# Patient Record
Sex: FEMALE | Race: WHITE | NOT HISPANIC OR LATINO | Employment: OTHER | URBAN - METROPOLITAN AREA
[De-identification: names, ages, dates, MRNs, and addresses within clinical notes are randomized per-mention and may not be internally consistent; named-entity substitution may affect disease eponyms.]

---

## 2018-09-13 ENCOUNTER — OFFICE VISIT (OUTPATIENT)
Dept: ENDOCRINOLOGY | Facility: HOSPITAL | Age: 73
End: 2018-09-13
Payer: COMMERCIAL

## 2018-09-13 VITALS
DIASTOLIC BLOOD PRESSURE: 68 MMHG | SYSTOLIC BLOOD PRESSURE: 116 MMHG | HEART RATE: 64 BPM | BODY MASS INDEX: 27.33 KG/M2 | HEIGHT: 60 IN | WEIGHT: 139.2 LBS

## 2018-09-13 DIAGNOSIS — Z92.3 HISTORY OF HEAD AND NECK RADIATION: ICD-10-CM

## 2018-09-13 DIAGNOSIS — Z98.890 HISTORY OF SURGICAL REMOVAL OF PITUITARY GLAND (HCC): Primary | ICD-10-CM

## 2018-09-13 DIAGNOSIS — E89.3 HISTORY OF SURGICAL REMOVAL OF PITUITARY GLAND (HCC): Primary | ICD-10-CM

## 2018-09-13 DIAGNOSIS — E83.52 HYPERCALCEMIA: ICD-10-CM

## 2018-09-13 PROCEDURE — 99204 OFFICE O/P NEW MOD 45 MIN: CPT | Performed by: INTERNAL MEDICINE

## 2018-09-13 RX ORDER — VENLAFAXINE HYDROCHLORIDE 75 MG/1
75 CAPSULE, EXTENDED RELEASE ORAL DAILY
COMMUNITY
Start: 2018-08-27

## 2018-09-13 RX ORDER — SIMVASTATIN 40 MG
40 TABLET ORAL
COMMUNITY
Start: 2018-06-27

## 2018-09-13 RX ORDER — ASPIRIN 325 MG
325 TABLET ORAL DAILY
COMMUNITY

## 2018-09-13 RX ORDER — ACEBUTOLOL HYDROCHLORIDE 400 MG/1
400 CAPSULE ORAL DAILY
COMMUNITY
Start: 2018-07-02

## 2018-09-13 RX ORDER — LANSOPRAZOLE 30 MG/1
30 CAPSULE, DELAYED RELEASE ORAL DAILY
COMMUNITY
Start: 2018-07-01

## 2018-09-13 RX ORDER — SPIRONOLACTONE 25 MG/1
25 TABLET ORAL DAILY
COMMUNITY
Start: 2018-08-13

## 2018-09-13 RX ORDER — CHOLECALCIFEROL (VITAMIN D3) 125 MCG
2000 CAPSULE ORAL DAILY
COMMUNITY

## 2018-09-13 NOTE — LETTER
September 13, 2018     Severa Fess  [address]    Patient: Onel Delgadillo   YOB: 1945   Date of Visit: 9/13/2018       Dear Dr Clinton Sosa: Thank you for referring Onel Delgadillo to me for evaluation  Below are my notes for this consultation  If you have questions, please do not hesitate to call me  I look forward to following your patient along with you  Sincerely,        Hal Marte DO        CC: No Recipients  Hal Marte DO  9/13/2018 11:13 AM  Sign at close encounter  9/13/2018    Assessment/Plan      Diagnoses and all orders for this visit:    History of surgical removal of pituitary gland (HCC)    History of head and neck radiation    Hypercalcemia  -     Comprehensive metabolic panel Lab Collect; Future  -     PTH, intact- Lab Collect; Future  -     Cortisol Level, AM Specimen- Lab Collect; Future  -     Vitamin D 25 hydroxy Lab Collect; Future    Other orders  -     acebutolol (SECTRAL) 400 MG capsule;   -     venlafaxine (EFFEXOR-XR) 75 mg 24 hr capsule;   -     lansoprazole (PREVACID) 30 mg capsule;   -     simvastatin (ZOCOR) 40 mg tablet;   -     spironolactone (ALDACTONE) 25 mg tablet;   -     aspirin 325 mg tablet; Take 325 mg by mouth daily  -     Multiple Vitamins-Minerals (MULTIVITAMIN ADULT PO); Take by mouth  -     Cholecalciferol (VITAMIN D3) 2000 units TABS; Take 2,000 Units by mouth daily  -     Cetirizine HCl (ZYRTEC ALLERGY) 10 MG CAPS; Take by mouth        Assessment/Plan:  1  History of pituitary tumor status post transsphenoidal resection as well as radiation treatment:  MRI from April 2017 did not show any changes  I think we can hold off on repeat imaging at this time  Will discuss at her next appointment regarding repeat imaging of the MRI  Given her history of transsphenoidal surgery as well as radiation, we are monitoring for pituitary hormonal hypofunction    At this point she has not developed any   I am seeing her back in 6 months for evaluation of hypercalcemia as noted below  At that appointment I will reorder blood work for her appointment next September for hormonal evaluation  Given that her cortisol level was measured at 1:00 p m  in the recent blood work, I did order an a m  cortisol to be added to her upcoming calcium evaluation  2   Hypercalcemia:  Incidentally discovered on recent blood work  No history of kidney stones  She does state she has osteopenia is being considered for Prolia  I advised her to increase her hydration  We will recheck a CMP  I have also added a 25 hydroxy vitamin-D and PTH to be drawn with this  I discussed that if it looks like a parathyroid ideology, we may need to do additional testing such as DEXA scan, 24 urine for calcium, renal ultrasound  We will be in touch with her as results are available  Follow-up in 6 months  CC:   History of pituitary surgery and radiation    History of Present Illness     HPI: Viviane Knapp is a 74y o  year old female with history of pituitary tumor that was discovered about 20 years ago when she developed left-sided numbness and tingling and weakness  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  She was then sent for transsphenoidal surgery at [facility]  Few years later she developed a recurrence and she required radiation treatment  After that, she is monitored by doctor Ann Sanchez for any development of pituitary hormone hypofunction  She has remained free of requiring any hormone supplementation  She presents today to establish care and review blood work  Overall she feels well  She states that she had recent rotator cuff surgery and was recovering from this  She reports no symptoms of hypothyroidism  Denies any frequent headaches or vision changes  Denies any weight changes, lightheadedness, dizziness  She denies recent kidney stones    She does have history of osteopenia and has seen a rheumatologist for this to suggested Prolia at some point  She has not received Prolia yet  No history of fragility fracture  No known family history of hypercalcemia or parathyroid pathology  No family history of pituitary pathology as well  She does not family history of thyroid disorder  Review of Systems   Constitutional: Negative for fatigue  HENT: Negative for trouble swallowing and voice change  Eyes: Negative for visual disturbance  Respiratory: Negative for shortness of breath  Cardiovascular: Negative for palpitations and leg swelling  Gastrointestinal: Negative for abdominal pain, nausea and vomiting  Endocrine: Negative for polydipsia and polyuria  Musculoskeletal: Negative for arthralgias and myalgias  Skin: Negative for rash  Neurological: Negative for dizziness, tremors and weakness  Hematological: Negative for adenopathy  Psychiatric/Behavioral: Negative for agitation and confusion  Historical Information   No past medical history on file  No past surgical history on file    Social History   History   Alcohol use Not on file     History   Drug use: Unknown     History   Smoking Status    Never Smoker   Smokeless Tobacco    Never Used     Family History:   Family History   Problem Relation Age of Onset    Arthritis Mother     Diabetes unspecified Mother     Cancer Mother     Hypertension Mother     Heart failure Father     Arthritis Father     Diabetes unspecified Father     Cancer Father     Hypertension Father     Heart disease Sister     Heart attack Sister     Stroke Brother     Gout Son     Hyperthyroidism Son     Hyperthyroidism Daughter        Meds/Allergies   Current Outpatient Prescriptions   Medication Sig Dispense Refill    acebutolol (SECTRAL) 400 MG capsule       aspirin 325 mg tablet Take 325 mg by mouth daily      Cetirizine HCl (ZYRTEC ALLERGY) 10 MG CAPS Take by mouth      Cholecalciferol (VITAMIN D3) 2000 units TABS Take 2,000 Units by mouth daily      lansoprazole (PREVACID) 30 mg capsule       Multiple Vitamins-Minerals (MULTIVITAMIN ADULT PO) Take by mouth      simvastatin (ZOCOR) 40 mg tablet       spironolactone (ALDACTONE) 25 mg tablet       venlafaxine (EFFEXOR-XR) 75 mg 24 hr capsule        No current facility-administered medications for this visit  Allergies   Allergen Reactions    Oxycodone     Sulfa Antibiotics        Objective   Vitals: Blood pressure 116/68, pulse 64, height 5' (1 524 m), weight 63 1 kg (139 lb 3 2 oz)  Invasive Devices          No matching active lines, drains, or airways          Physical Exam   Constitutional: She is oriented to person, place, and time  She appears well-developed and well-nourished  No distress  HENT:   Head: Normocephalic and atraumatic  Eyes: Conjunctivae are normal  Pupils are equal, round, and reactive to light  Neck: Normal range of motion  Neck supple  No thyromegaly present  Cardiovascular: Normal rate and regular rhythm  No murmur heard  Pulmonary/Chest: Effort normal and breath sounds normal  No respiratory distress  Abdominal: Soft  Bowel sounds are normal  She exhibits no distension  Musculoskeletal: Normal range of motion  She exhibits no edema  Lymphadenopathy:     She has no cervical adenopathy  Neurological: She is alert and oriented to person, place, and time  She exhibits normal muscle tone  Skin: Skin is warm and dry  No rash noted  She is not diaphoretic  Psychiatric: She has a normal mood and affect  Her behavior is normal        The history was obtained from the review of the chart and from the patient      Lab Results:      Labs from 09/06/2018 at [facility]:  Glucose 138, BUN 15, creatinine 0 9, GFR greater than 60, sodium 140, potassium 4 1, alk phos 74, calcium 10 5, albumin 4 7, bilirubin 0 9, AST 21, ALT 28, white blood cell 7 4, hemoglobin 13 1, hematocrit 36 8, platelets 439, free T4 1 05, FSH 26 6, LH 13 1, prolactin 16 9, random cortisol at 1:54 p m  was 4 6 IGF-1 120     04/03/2017 at [facility]:  MRI shows stable postsurgical appearance of the sella turcica following transsphenoidal resection of the pituitary neoplasm  No MRI evidence for new enhancing mass to suggest local tumor recurrence  No future appointments

## 2018-09-13 NOTE — PROGRESS NOTES
9/13/2018    Assessment/Plan      Diagnoses and all orders for this visit:    History of surgical removal of pituitary gland (HCC)    History of head and neck radiation    Hypercalcemia  -     Comprehensive metabolic panel Lab Collect; Future  -     PTH, intact- Lab Collect; Future  -     Cortisol Level, AM Specimen- Lab Collect; Future  -     Vitamin D 25 hydroxy Lab Collect; Future    Other orders  -     acebutolol (SECTRAL) 400 MG capsule;   -     venlafaxine (EFFEXOR-XR) 75 mg 24 hr capsule;   -     lansoprazole (PREVACID) 30 mg capsule;   -     simvastatin (ZOCOR) 40 mg tablet;   -     spironolactone (ALDACTONE) 25 mg tablet;   -     aspirin 325 mg tablet; Take 325 mg by mouth daily  -     Multiple Vitamins-Minerals (MULTIVITAMIN ADULT PO); Take by mouth  -     Cholecalciferol (VITAMIN D3) 2000 units TABS; Take 2,000 Units by mouth daily  -     Cetirizine HCl (ZYRTEC ALLERGY) 10 MG CAPS; Take by mouth        Assessment/Plan:  1  History of pituitary tumor status post transsphenoidal resection as well as radiation treatment:  MRI from April 2017 did not show any changes  I think we can hold off on repeat imaging at this time  Will discuss at her next appointment regarding repeat imaging of the MRI  Given her history of transsphenoidal surgery as well as radiation, we are monitoring for pituitary hormonal hypofunction  At this point she has not developed any  I am seeing her back in 6 months for evaluation of hypercalcemia as noted below  At that appointment I will reorder blood work for her appointment next September for hormonal evaluation  Given that her cortisol level was measured at 1:00 p m  in the recent blood work, I did order an a m  cortisol to be added to her upcoming calcium evaluation  2   Hypercalcemia:  Incidentally discovered on recent blood work  No history of kidney stones  She does state she has osteopenia is being considered for Prolia  I advised her to increase her hydration  We will recheck a CMP  I have also added a 25 hydroxy vitamin-D and PTH to be drawn with this  I discussed that if it looks like a parathyroid ideology, we may need to do additional testing such as DEXA scan, 24 urine for calcium, renal ultrasound  We will be in touch with her as results are available  Follow-up in 6 months  CC:   History of pituitary surgery and radiation    History of Present Illness     HPI: Antonette De Leon is a 74y o  year old female with history of pituitary tumor that was discovered about 20 years ago when she developed left-sided numbness and tingling and weakness  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  She was then sent for transsphenoidal surgery at [facility]  Few years later she developed a recurrence and she required radiation treatment  After that, she is monitored by doctor Aurelia Fierro for any development of pituitary hormone hypofunction  She has remained free of requiring any hormone supplementation  She presents today to establish care and review blood work  Overall she feels well  She states that she had recent rotator cuff surgery and was recovering from this  She reports no symptoms of hypothyroidism  Denies any frequent headaches or vision changes  Denies any weight changes, lightheadedness, dizziness  She denies recent kidney stones  She does have history of osteopenia and has seen a rheumatologist for this to suggested Prolia at some point  She has not received Prolia yet  No history of fragility fracture  No known family history of hypercalcemia or parathyroid pathology  No family history of pituitary pathology as well  She does not family history of thyroid disorder  Review of Systems   Constitutional: Negative for fatigue  HENT: Negative for trouble swallowing and voice change  Eyes: Negative for visual disturbance  Respiratory: Negative for shortness of breath  Cardiovascular: Negative for palpitations and leg swelling  Gastrointestinal: Negative for abdominal pain, nausea and vomiting  Endocrine: Negative for polydipsia and polyuria  Musculoskeletal: Negative for arthralgias and myalgias  Skin: Negative for rash  Neurological: Negative for dizziness, tremors and weakness  Hematological: Negative for adenopathy  Psychiatric/Behavioral: Negative for agitation and confusion  Historical Information   No past medical history on file  No past surgical history on file  Social History   History   Alcohol use Not on file     History   Drug use: Unknown     History   Smoking Status    Never Smoker   Smokeless Tobacco    Never Used     Family History:   Family History   Problem Relation Age of Onset    Arthritis Mother     Diabetes unspecified Mother     Cancer Mother     Hypertension Mother     Heart failure Father     Arthritis Father     Diabetes unspecified Father     Cancer Father     Hypertension Father     Heart disease Sister     Heart attack Sister     Stroke Brother     Gout Son     Hyperthyroidism Son     Hyperthyroidism Daughter        Meds/Allergies   Current Outpatient Prescriptions   Medication Sig Dispense Refill    acebutolol (SECTRAL) 400 MG capsule       aspirin 325 mg tablet Take 325 mg by mouth daily      Cetirizine HCl (ZYRTEC ALLERGY) 10 MG CAPS Take by mouth      Cholecalciferol (VITAMIN D3) 2000 units TABS Take 2,000 Units by mouth daily      lansoprazole (PREVACID) 30 mg capsule       Multiple Vitamins-Minerals (MULTIVITAMIN ADULT PO) Take by mouth      simvastatin (ZOCOR) 40 mg tablet       spironolactone (ALDACTONE) 25 mg tablet       venlafaxine (EFFEXOR-XR) 75 mg 24 hr capsule        No current facility-administered medications for this visit  Allergies   Allergen Reactions    Oxycodone     Sulfa Antibiotics        Objective   Vitals: Blood pressure 116/68, pulse 64, height 5' (1 524 m), weight 63 1 kg (139 lb 3 2 oz)    Invasive Devices          No matching active lines, drains, or airways          Physical Exam   Constitutional: She is oriented to person, place, and time  She appears well-developed and well-nourished  No distress  HENT:   Head: Normocephalic and atraumatic  Eyes: Conjunctivae are normal  Pupils are equal, round, and reactive to light  Neck: Normal range of motion  Neck supple  No thyromegaly present  Cardiovascular: Normal rate and regular rhythm  No murmur heard  Pulmonary/Chest: Effort normal and breath sounds normal  No respiratory distress  Abdominal: Soft  Bowel sounds are normal  She exhibits no distension  Musculoskeletal: Normal range of motion  She exhibits no edema  Lymphadenopathy:     She has no cervical adenopathy  Neurological: She is alert and oriented to person, place, and time  She exhibits normal muscle tone  Skin: Skin is warm and dry  No rash noted  She is not diaphoretic  Psychiatric: She has a normal mood and affect  Her behavior is normal        The history was obtained from the review of the chart and from the patient  Lab Results:      Labs from 09/06/2018 at [facility]:  Glucose 138, BUN 15, creatinine 0 9, GFR greater than 60, sodium 140, potassium 4 1, alk phos 74, calcium 10 5, albumin 4 7, bilirubin 0 9, AST 21, ALT 28, white blood cell 7 4, hemoglobin 13 1, hematocrit 36 8, platelets 143, free T4 1 05, FSH 26 6, LH 13 1, prolactin 16 9, random cortisol at 1:54 p m  was 4 6 IGF-1 120     04/03/2017 at [facility]:  MRI shows stable postsurgical appearance of the sella turcica following transsphenoidal resection of the pituitary neoplasm  No MRI evidence for new enhancing mass to suggest local tumor recurrence  No future appointments

## 2018-10-22 ENCOUNTER — TELEPHONE (OUTPATIENT)
Dept: ENDOCRINOLOGY | Facility: HOSPITAL | Age: 73
End: 2018-10-22

## 2018-10-22 NOTE — TELEPHONE ENCOUNTER
Please let patient know her calcium level is now at the upper end of the normal range and her other testing done at [facility] on 10/12/2018 looked normal     Labs from [facility] on 10/12/2018:  Glucose 120, BUN 12, creatinine 0 8, GFR greater than 60, sodium 143, potassium 4 7, alk phos 73, calcium 10 1, albumin 4 5, bilirubin 1 0, AST 23, ALT 24, cortisol 10 6 at 7:00 a m , 25 hydroxy vitamin-D 58 2, PTH 53 35

## 2019-03-21 ENCOUNTER — OFFICE VISIT (OUTPATIENT)
Dept: ENDOCRINOLOGY | Facility: HOSPITAL | Age: 74
End: 2019-03-21
Payer: COMMERCIAL

## 2019-03-21 VITALS
DIASTOLIC BLOOD PRESSURE: 68 MMHG | WEIGHT: 142.8 LBS | HEIGHT: 60 IN | SYSTOLIC BLOOD PRESSURE: 132 MMHG | BODY MASS INDEX: 28.03 KG/M2 | HEART RATE: 64 BPM

## 2019-03-21 DIAGNOSIS — Z92.3 HISTORY OF HEAD AND NECK RADIATION: Primary | ICD-10-CM

## 2019-03-21 DIAGNOSIS — E83.52 HYPERCALCEMIA: ICD-10-CM

## 2019-03-21 DIAGNOSIS — Z98.890 HISTORY OF SURGICAL REMOVAL OF PITUITARY GLAND (HCC): ICD-10-CM

## 2019-03-21 DIAGNOSIS — E89.3 HISTORY OF SURGICAL REMOVAL OF PITUITARY GLAND (HCC): ICD-10-CM

## 2019-03-21 PROCEDURE — 99214 OFFICE O/P EST MOD 30 MIN: CPT | Performed by: INTERNAL MEDICINE

## 2019-03-21 NOTE — PROGRESS NOTES
3/21/2019    Assessment/Plan      Diagnoses and all orders for this visit:    History of head and neck radiation  -     Comprehensive metabolic panel Lab Collect; Future  -     Insulin-like growth factor 1 (IGF-1) - Lab Collect; Future  -     Cortisol Level, AM Specimen- Lab Collect; Future  -     TSH, 3rd generation Lab Collect; Future  -     T4, free Lab Collect; Future  -     Prolactin- Lab Collect; Future  -     Follicle stimulating hormone Lab Collect; Future  -     Luteinizing hormone Lab Collect; Future  -     MRI brain pituitary wo and w contrast; Future    History of surgical removal of pituitary gland ([facility] [address] )  -     Comprehensive metabolic panel Lab Collect; Future  -     Insulin-like growth factor 1 (IGF-1) - Lab Collect; Future  -     Cortisol Level, AM Specimen- Lab Collect; Future  -     TSH, 3rd generation Lab Collect; Future  -     T4, free Lab Collect; Future  -     Prolactin- Lab Collect; Future  -     Follicle stimulating hormone Lab Collect; Future  -     Luteinizing hormone Lab Collect; Future  -     MRI brain pituitary wo and w contrast; Future    Hypercalcemia  -     Comprehensive metabolic panel Lab Collect; Future  -     Insulin-like growth factor 1 (IGF-1) - Lab Collect; Future  -     Cortisol Level, AM Specimen- Lab Collect; Future  -     TSH, 3rd generation Lab Collect; Future  -     T4, free Lab Collect; Future  -     Prolactin- Lab Collect; Future  -     Follicle stimulating hormone Lab Collect; Future  -     Luteinizing hormone Lab Collect; Future  -     MRI brain pituitary wo and w contrast; Future        Assessment/Plan:  1  History of pituitary tumor status post transsphenoidal resection radiation:  Clinically she is doing well  I will plan to repeat a pituitary hormonal workup as listed above prior to next appointment which will be in September  I have also ordered a pituitary MRI to be done prior to that appointment    As long as everything looks stable at that appointment, I will plan to see her back annually  2  Mild hypercalcemia:  Corrects to high normal with her higher albumin level  I have suggest suggested we monitor this over time  CC: follow up    History of Present Illness     HPI: Arpit Mansfield is a 74y o  year old female with history of a pituitary tumor that was discovered over 20 years ago when she developed left-sided numbness and tingling and weakness  She then underwent [facility]  She then developed a recurrence and required radiation treatment  After that she has been monitored by Endocrinology for development of any pituitary hormone dysfunction  She presents today for follow-up appointment  She presents today overall feeling well  No vision changes  She has been getting more headaches lately when she is reading her focusing her eyes more  No peripheral vision changes  Review of Systems   Constitutional: Negative for fatigue  HENT: Negative for trouble swallowing and voice change  Eyes: Negative for visual disturbance  Respiratory: Negative for shortness of breath  Cardiovascular: Negative for palpitations and leg swelling  Gastrointestinal: Negative for abdominal pain, nausea and vomiting  Endocrine: Negative for polydipsia and polyuria  Musculoskeletal: Negative for arthralgias and myalgias  Skin: Negative for rash  Neurological: Negative for dizziness, tremors and weakness  Hematological: Negative for adenopathy  Psychiatric/Behavioral: Negative for agitation and confusion  Historical Information   History reviewed  No pertinent past medical history  History reviewed  No pertinent surgical history    Social History   Social History     Substance and Sexual Activity   Alcohol Use Not on file     Social History     Substance and Sexual Activity   Drug Use Not on file     Social History     Tobacco Use   Smoking Status Never Smoker   Smokeless Tobacco Never Used     Family History:   Family History   Problem Relation Age of Onset    Arthritis Mother     Diabetes unspecified Mother     Cancer Mother     Hypertension Mother     Heart failure Father     Arthritis Father     Diabetes unspecified Father     Cancer Father     Hypertension Father     Heart disease Sister     Heart attack Sister     Stroke Brother     Gout Son     Hyperthyroidism Son     Hyperthyroidism Daughter        Meds/Allergies   Current Outpatient Medications   Medication Sig Dispense Refill    acebutolol (SECTRAL) 400 MG capsule       aspirin 325 mg tablet Take 325 mg by mouth daily      Cetirizine HCl (ZYRTEC ALLERGY) 10 MG CAPS Take by mouth      Cholecalciferol (VITAMIN D3) 2000 units TABS Take 2,000 Units by mouth daily      lansoprazole (PREVACID) 30 mg capsule       Multiple Vitamins-Minerals (MULTIVITAMIN ADULT PO) Take by mouth      simvastatin (ZOCOR) 40 mg tablet       spironolactone (ALDACTONE) 25 mg tablet       venlafaxine (EFFEXOR-XR) 75 mg 24 hr capsule        No current facility-administered medications for this visit  Allergies   Allergen Reactions    Oxycodone     Sulfa Antibiotics        Objective   Vitals: Blood pressure 132/68, pulse 64, height 5' (1 524 m), weight 64 8 kg (142 lb 12 8 oz)  Invasive Devices          None          Physical Exam   Constitutional: She is oriented to person, place, and time  She appears well-developed and well-nourished  No distress  HENT:   Head: Normocephalic and atraumatic  Neck: Normal range of motion  Neck supple  No thyromegaly present  Cardiovascular: Normal rate and regular rhythm  Pulmonary/Chest: Effort normal and breath sounds normal    Abdominal: Soft  Bowel sounds are normal    Musculoskeletal: Normal range of motion  She exhibits no edema  Neurological: She is alert and oriented to person, place, and time  She exhibits normal muscle tone  Skin: Skin is warm and dry  No rash noted  She is not diaphoretic     Psychiatric: She has a normal mood and affect  Her behavior is normal    Vitals reviewed  The history was obtained from the review of the chart and from the patient  Lab Results:      Labs from [facility] on 03/12/2019:  Glucose 126, BUN 16, creatinine 0 8, GFR greater than 60, sodium 139, potassium 4 6, calcium 10 5, albumin 4 8, liver function within normal limits, cortisol 12 6 at 7:56 a m , 25 hydroxy vitamin-D 55, PTH 47 22     04/03/2017 at [facility]:  MRI shows stable postsurgical appearance of the sella turcica following transsphenoidal resection of the pituitary neoplasm  No MRI evidence for new enhancing mass to suggest local tumor recurrence            No future appointments  Portions of the record may have been created with voice recognition software  Occasional wrong word or "sound a like" substitutions may have occurred due to the inherent limitations of voice recognition software  Read the chart carefully and recognize, using context, where substitutions have occurred

## 2019-09-23 ENCOUNTER — OFFICE VISIT (OUTPATIENT)
Dept: ENDOCRINOLOGY | Facility: HOSPITAL | Age: 74
End: 2019-09-23
Payer: COMMERCIAL

## 2019-09-23 VITALS
HEART RATE: 68 BPM | HEIGHT: 60 IN | SYSTOLIC BLOOD PRESSURE: 124 MMHG | BODY MASS INDEX: 26.66 KG/M2 | WEIGHT: 135.8 LBS | DIASTOLIC BLOOD PRESSURE: 72 MMHG

## 2019-09-23 DIAGNOSIS — Z92.3 HISTORY OF HEAD AND NECK RADIATION: ICD-10-CM

## 2019-09-23 DIAGNOSIS — Z98.890 HISTORY OF SURGICAL REMOVAL OF PITUITARY GLAND (HCC): Primary | ICD-10-CM

## 2019-09-23 DIAGNOSIS — E89.3 HISTORY OF SURGICAL REMOVAL OF PITUITARY GLAND (HCC): Primary | ICD-10-CM

## 2019-09-23 PROCEDURE — 99214 OFFICE O/P EST MOD 30 MIN: CPT | Performed by: INTERNAL MEDICINE

## 2019-09-23 NOTE — PROGRESS NOTES
9/23/2019    Assessment/Plan      Diagnoses and all orders for this visit:    History of surgical removal of pituitary gland [facility])  -     Comprehensive metabolic panel Lab Collect; Future  -     Vitamin D 25 hydroxy Lab Collect; Future  -     PTH, intact- Lab Collect; Future  -     Cortisol Level, AM Specimen- Lab Collect; Future  -     TSH, 3rd generation Lab Collect; Future  -     T4, free Lab Collect; Future  -     Insulin-like growth factor 1 (IGF-1) Lab Collect; Future  -     Follicle stimulating hormone Lab Collect; Future  -     Luteinizing hormone Lab Collect; Future  -     Prolactin- Lab Collect; Future    History of head and neck radiation  -     Comprehensive metabolic panel Lab Collect; Future  -     Vitamin D 25 hydroxy Lab Collect; Future  -     PTH, intact- Lab Collect; Future  -     Cortisol Level, AM Specimen- Lab Collect; Future  -     TSH, 3rd generation Lab Collect; Future  -     T4, free Lab Collect; Future  -     Insulin-like growth factor 1 (IGF-1) Lab Collect; Future  -     Follicle stimulating hormone Lab Collect; Future  -     Luteinizing hormone Lab Collect; Future  -     Prolactin- Lab Collect; Future        Assessment/Plan:  40-year-old female presents for follow-up of history of pituitary adenoma status post surgery and radiation  Recent hormonal evaluation all looks stable as listed below  Recent MRI does not show any evidence of recurrence  I will plan to see her back in 1 year with labs as ordered above just prior  We will plan to repeat an MRI in about 2 or 3 years  I asked her to call me sooner with any questions or concerns  CC:  Follow-up    History of Present Illness     HPI: Colie Peabody is a 74y o  year old female with history of a pituitary tumor that was discovered over 20 years ago when she developed left-sided numbness and tingling and weakness  She then underwent transsphenoidal surgery at [facility]  She had a recurrence which required radiation treatment  She has been monitored by Endocrinology for the development of any pituitary hormone dysfunction over time  She presents today for follow-up appointment  Overall she feels well  Energy level is adequate  Weight has overall been stable over the past year  No gastrointestinal symptoms  No changes in vision or headaches  Review of Systems   Constitutional: Negative for fatigue  HENT: Negative for trouble swallowing and voice change  Eyes: Negative for visual disturbance  Respiratory: Negative for shortness of breath  Cardiovascular: Negative for palpitations and leg swelling  Gastrointestinal: Negative for abdominal pain, nausea and vomiting  Endocrine: Negative for polydipsia and polyuria  Musculoskeletal: Negative for arthralgias and myalgias  Skin: Negative for rash  Neurological: Negative for dizziness, tremors and weakness  Hematological: Negative for adenopathy  Psychiatric/Behavioral: Negative for agitation and confusion  Historical Information   History reviewed  No pertinent past medical history  History reviewed  No pertinent surgical history    Social History   Social History     Substance and Sexual Activity   Alcohol Use Not on file     Social History     Substance and Sexual Activity   Drug Use Not on file     Social History     Tobacco Use   Smoking Status Never Smoker   Smokeless Tobacco Never Used     Family History:   Family History   Problem Relation Age of Onset    Arthritis Mother     Diabetes unspecified Mother    [facility] Cancer Mother     Hypertension Mother     Heart failure Father     Arthritis Father     Diabetes unspecified Father     Cancer Father     Hypertension Father     Heart disease Sister     Heart attack Sister     Stroke Brother     Gout Son     Hyperthyroidism Son     Hyperthyroidism Daughter        Meds/Allergies   Current Outpatient Medications   Medication Sig Dispense Refill    acebutolol (SECTRAL) 400 MG capsule Take 400 mg by mouth daily       aspirin 325 mg tablet Take 325 mg by mouth daily      Cetirizine HCl (ZYRTEC ALLERGY) 10 MG CAPS Take by mouth      Cholecalciferol (VITAMIN D3) 2000 units TABS Take 2,000 Units by mouth daily      lansoprazole (PREVACID) 30 mg capsule Take 30 mg by mouth daily       Multiple Vitamins-Minerals (MULTIVITAMIN ADULT PO) Take by mouth      simvastatin (ZOCOR) 40 mg tablet Take 40 mg by mouth daily at bedtime       spironolactone (ALDACTONE) 25 mg tablet Take 25 mg by mouth daily       venlafaxine (EFFEXOR-XR) 75 mg 24 hr capsule Take 75 mg by mouth daily        No current facility-administered medications for this visit  Allergies   Allergen Reactions    Oxycodone     Sulfa Antibiotics        Objective   Vitals: Blood pressure 124/72, pulse 68, height 5' (1 524 m), weight 61 6 kg (135 lb 12 8 oz)  Invasive Devices     None                 Physical Exam   Constitutional: She is oriented to person, place, and time  She appears well-developed and well-nourished  No distress  HENT:   Head: Normocephalic and atraumatic  Mouth/Throat: No oropharyngeal exudate  Neck: Normal range of motion  Neck supple  No thyromegaly present  Cardiovascular: Normal rate and regular rhythm  Pulmonary/Chest: Effort normal and breath sounds normal  No respiratory distress  Abdominal: Soft  Bowel sounds are normal    Musculoskeletal: Normal range of motion  She exhibits no edema  Neurological: She is alert and oriented to person, place, and time  She exhibits normal muscle tone  Skin: Skin is warm and dry  No rash noted  She is not diaphoretic  Psychiatric: She has a normal mood and affect  Her behavior is normal    Vitals reviewed  The history was obtained from the review of the chart and from the patient  Lab Results:      MRI at [facility] on 09/05/2019:  Evidence of previous pituitary transsphenoidal surgery was stable appearance since exam from April of 2017   No evidence of recurrence  Labs from [facility] on 09/13/2019:  IGF-1 135, glucose 112, BUN 18, creatinine 0 9, GFR greater than 60, sodium 139, potassium 4 8, alk phos 69, calcium 10 4, albumin 4 8, AST 30, ALT 14, TSH 3 3, free T4 1 0, FSH 26 6, LH 13 2, prolactin 15 2, cortisol 17 3  No future appointments  Portions of the record may have been created with voice recognition software  Occasional wrong word or "sound a like" substitutions may have occurred due to the inherent limitations of voice recognition software  Read the chart carefully and recognize, using context, where substitutions have occurred

## 2021-06-30 ENCOUNTER — TELEPHONE (OUTPATIENT)
Dept: ENDOCRINOLOGY | Facility: HOSPITAL | Age: 76
End: 2021-06-30

## 2021-06-30 DIAGNOSIS — Z98.890 HISTORY OF SURGICAL REMOVAL OF PITUITARY GLAND (HCC): Primary | ICD-10-CM

## 2021-06-30 DIAGNOSIS — Z92.3 HISTORY OF HEAD AND NECK RADIATION: ICD-10-CM

## 2021-06-30 DIAGNOSIS — E89.3 HISTORY OF SURGICAL REMOVAL OF PITUITARY GLAND (HCC): Primary | ICD-10-CM

## 2021-06-30 NOTE — TELEPHONE ENCOUNTER
Scheduled pt for 10/25/21  She does not want to be seen until then as she wants linked to her 's npt appt  Labs & MRI have   Can you reorder  Can be mailed

## 2021-10-25 ENCOUNTER — OFFICE VISIT (OUTPATIENT)
Dept: ENDOCRINOLOGY | Facility: HOSPITAL | Age: 76
End: 2021-10-25
Payer: COMMERCIAL

## 2021-10-25 VITALS
DIASTOLIC BLOOD PRESSURE: 80 MMHG | WEIGHT: 147.4 LBS | BODY MASS INDEX: 28.94 KG/M2 | HEART RATE: 70 BPM | HEIGHT: 60 IN | SYSTOLIC BLOOD PRESSURE: 148 MMHG

## 2021-10-25 DIAGNOSIS — R73.01 IMPAIRED FASTING GLUCOSE: ICD-10-CM

## 2021-10-25 DIAGNOSIS — E83.52 HYPERCALCEMIA: Primary | ICD-10-CM

## 2021-10-25 DIAGNOSIS — Z92.3 HISTORY OF HEAD AND NECK RADIATION: ICD-10-CM

## 2021-10-25 PROCEDURE — 99214 OFFICE O/P EST MOD 30 MIN: CPT | Performed by: INTERNAL MEDICINE

## 2023-02-08 ENCOUNTER — TELEPHONE (OUTPATIENT)
Dept: ENDOCRINOLOGY | Facility: CLINIC | Age: 78
End: 2023-02-08

## 2023-02-08 DIAGNOSIS — Z92.3 HISTORY OF HEAD AND NECK RADIATION: ICD-10-CM

## 2023-02-08 DIAGNOSIS — E89.3 HISTORY OF SURGICAL REMOVAL OF PITUITARY GLAND (HCC): Primary | ICD-10-CM

## 2023-02-08 DIAGNOSIS — Z98.890 HISTORY OF SURGICAL REMOVAL OF PITUITARY GLAND (HCC): Primary | ICD-10-CM

## 2023-02-10 ENCOUNTER — TELEPHONE (OUTPATIENT)
Dept: ENDOCRINOLOGY | Facility: CLINIC | Age: 78
End: 2023-02-10

## 2023-02-10 DIAGNOSIS — Z92.3 HISTORY OF HEAD AND NECK RADIATION: ICD-10-CM

## 2023-02-10 DIAGNOSIS — E83.52 HYPERCALCEMIA: ICD-10-CM

## 2023-02-10 DIAGNOSIS — Z98.890 HISTORY OF SURGICAL REMOVAL OF PITUITARY GLAND (HCC): Primary | ICD-10-CM

## 2023-02-10 DIAGNOSIS — E89.3 HISTORY OF SURGICAL REMOVAL OF PITUITARY GLAND (HCC): Primary | ICD-10-CM

## 2023-02-10 NOTE — TELEPHONE ENCOUNTER
Pt called today requesting a new order  For blood work prior to her MRI but also she has an upcoming appoitnmemt with you  and would like to get labs done prior to her upcoming kat ointment

## 2023-02-13 NOTE — TELEPHONE ENCOUNTER
Spoke to pt's spouse  Notified labs were ordered and have to be fasting and drawn between 7-9AM  He will pass the message along  Lab orders mailed

## 2023-02-20 ENCOUNTER — APPOINTMENT (OUTPATIENT)
Dept: LAB | Facility: HOSPITAL | Age: 78
End: 2023-02-20
Attending: INTERNAL MEDICINE

## 2023-02-20 DIAGNOSIS — E89.3 HISTORY OF SURGICAL REMOVAL OF PITUITARY GLAND (HCC): ICD-10-CM

## 2023-02-20 DIAGNOSIS — Z92.3 HISTORY OF HEAD AND NECK RADIATION: ICD-10-CM

## 2023-02-20 DIAGNOSIS — Z98.890 HISTORY OF SURGICAL REMOVAL OF PITUITARY GLAND (HCC): ICD-10-CM

## 2023-02-20 DIAGNOSIS — E83.52 HYPERCALCEMIA: ICD-10-CM

## 2023-02-20 LAB
25(OH)D3 SERPL-MCNC: 34.2 NG/ML (ref 30–100)
ALBUMIN SERPL BCP-MCNC: 3.6 G/DL (ref 3.5–5)
ALP SERPL-CCNC: 72 U/L (ref 46–116)
ALT SERPL W P-5'-P-CCNC: 11 U/L (ref 12–78)
ANION GAP SERPL CALCULATED.3IONS-SCNC: 8 MMOL/L (ref 4–13)
AST SERPL W P-5'-P-CCNC: 22 U/L (ref 5–45)
BILIRUB SERPL-MCNC: 0.56 MG/DL (ref 0.2–1)
BUN SERPL-MCNC: 15 MG/DL (ref 5–25)
CALCIUM SERPL-MCNC: 9.2 MG/DL (ref 8.3–10.1)
CHLORIDE SERPL-SCNC: 102 MMOL/L (ref 96–108)
CO2 SERPL-SCNC: 28 MMOL/L (ref 21–32)
CORTIS AM PEAK SERPL-MCNC: 12.6 UG/DL (ref 4.2–22.4)
CREAT SERPL-MCNC: 0.8 MG/DL (ref 0.6–1.3)
GFR SERPL CREATININE-BSD FRML MDRD: 70 ML/MIN/1.73SQ M
GLUCOSE P FAST SERPL-MCNC: 103 MG/DL (ref 65–99)
PHOSPHATE SERPL-MCNC: 3.5 MG/DL (ref 2.3–4.1)
POTASSIUM SERPL-SCNC: 4.1 MMOL/L (ref 3.5–5.3)
PROLACTIN SERPL-MCNC: 11.2 NG/ML
PROT SERPL-MCNC: 8.4 G/DL (ref 6.4–8.4)
PTH-INTACT SERPL-MCNC: 72.2 PG/ML (ref 18.4–80.1)
SODIUM SERPL-SCNC: 138 MMOL/L (ref 135–147)
T4 FREE SERPL-MCNC: 0.89 NG/DL (ref 0.76–1.46)
TSH SERPL DL<=0.05 MIU/L-ACNC: 2.49 UIU/ML (ref 0.45–4.5)

## 2023-02-21 LAB — ACTH PLAS-MCNC: 29.5 PG/ML (ref 7.2–63.3)

## 2023-03-04 ENCOUNTER — HOSPITAL ENCOUNTER (OUTPATIENT)
Dept: MRI IMAGING | Facility: HOSPITAL | Age: 78
Discharge: HOME | End: 2023-03-04
Attending: INTERNAL MEDICINE

## 2023-03-04 DIAGNOSIS — E89.3 HISTORY OF SURGICAL REMOVAL OF PITUITARY GLAND (HCC): ICD-10-CM

## 2023-03-04 DIAGNOSIS — Z98.890 HISTORY OF SURGICAL REMOVAL OF PITUITARY GLAND (HCC): ICD-10-CM

## 2023-03-04 DIAGNOSIS — Z92.3 HISTORY OF HEAD AND NECK RADIATION: ICD-10-CM

## 2023-03-04 RX ADMIN — GADOBUTROL 7 ML: 604.72 INJECTION INTRAVENOUS at 14:14

## 2024-04-12 ENCOUNTER — APPOINTMENT (OUTPATIENT)
Dept: LAB | Facility: HOSPITAL | Age: 79
End: 2024-04-12
Payer: COMMERCIAL

## 2024-04-12 ENCOUNTER — TELEPHONE (OUTPATIENT)
Age: 79
End: 2024-04-12

## 2024-04-12 DIAGNOSIS — Z98.890 HISTORY OF SURGICAL REMOVAL OF PITUITARY GLAND (HCC): ICD-10-CM

## 2024-04-12 DIAGNOSIS — E89.3 HISTORY OF SURGICAL REMOVAL OF PITUITARY GLAND (HCC): ICD-10-CM

## 2024-04-12 DIAGNOSIS — R73.01 IMPAIRED FASTING GLUCOSE: Primary | ICD-10-CM

## 2024-04-12 DIAGNOSIS — R73.01 IMPAIRED FASTING GLUCOSE: ICD-10-CM

## 2024-04-12 DIAGNOSIS — Z92.3 HISTORY OF HEAD AND NECK RADIATION: ICD-10-CM

## 2024-04-12 LAB
ALBUMIN SERPL BCP-MCNC: 4.3 G/DL (ref 3.5–5)
ALP SERPL-CCNC: 51 U/L (ref 34–104)
ALT SERPL W P-5'-P-CCNC: 17 U/L (ref 7–52)
ANION GAP SERPL CALCULATED.3IONS-SCNC: 8 MMOL/L (ref 4–13)
AST SERPL W P-5'-P-CCNC: 21 U/L (ref 13–39)
BILIRUB SERPL-MCNC: 1.23 MG/DL (ref 0.2–1)
BUN SERPL-MCNC: 18 MG/DL (ref 5–25)
CALCIUM SERPL-MCNC: 9.8 MG/DL (ref 8.4–10.2)
CHLORIDE SERPL-SCNC: 99 MMOL/L (ref 96–108)
CO2 SERPL-SCNC: 27 MMOL/L (ref 21–32)
CORTIS AM PEAK SERPL-MCNC: 5.3 UG/DL (ref 6.7–22.6)
CREAT SERPL-MCNC: 0.83 MG/DL (ref 0.6–1.3)
EST. AVERAGE GLUCOSE BLD GHB EST-MCNC: 134 MG/DL
FSH SERPL-ACNC: 28.6 MIU/ML
GFR SERPL CREATININE-BSD FRML MDRD: 66 ML/MIN/1.73SQ M
GLUCOSE P FAST SERPL-MCNC: 100 MG/DL (ref 65–99)
HBA1C MFR BLD: 6.3 %
POTASSIUM SERPL-SCNC: 4.3 MMOL/L (ref 3.5–5.3)
PROLACTIN SERPL-MCNC: 11.41 NG/ML (ref 2.74–19.64)
PROT SERPL-MCNC: 8.6 G/DL (ref 6.4–8.4)
SODIUM SERPL-SCNC: 134 MMOL/L (ref 135–147)
T4 FREE SERPL-MCNC: 0.64 NG/DL (ref 0.61–1.12)
TSH SERPL DL<=0.05 MIU/L-ACNC: 2.45 UIU/ML (ref 0.45–4.5)

## 2024-04-12 PROCEDURE — 83036 HEMOGLOBIN GLYCOSYLATED A1C: CPT

## 2024-04-12 PROCEDURE — 84439 ASSAY OF FREE THYROXINE: CPT

## 2024-04-12 PROCEDURE — 36415 COLL VENOUS BLD VENIPUNCTURE: CPT

## 2024-04-12 PROCEDURE — 84443 ASSAY THYROID STIM HORMONE: CPT

## 2024-04-12 PROCEDURE — 83001 ASSAY OF GONADOTROPIN (FSH): CPT

## 2024-04-12 PROCEDURE — 84146 ASSAY OF PROLACTIN: CPT

## 2024-04-12 PROCEDURE — 84305 ASSAY OF SOMATOMEDIN: CPT

## 2024-04-12 PROCEDURE — 82533 TOTAL CORTISOL: CPT

## 2024-04-12 PROCEDURE — 80053 COMPREHEN METABOLIC PANEL: CPT

## 2024-04-12 NOTE — TELEPHONE ENCOUNTER
Aleksandra from [facility]'s Lab called in stating pt's labs were . I called the [place] office to have new script sent over for pt.

## 2024-04-14 LAB — IGF-I SERPL-MCNC: 109 NG/ML (ref 42–185)

## 2024-04-17 ENCOUNTER — TREATMENT (OUTPATIENT)
Dept: ENDOCRINOLOGY | Facility: CLINIC | Age: 79
End: 2024-04-17

## 2024-04-17 ENCOUNTER — TELEPHONE (OUTPATIENT)
Dept: ENDOCRINOLOGY | Facility: CLINIC | Age: 79
End: 2024-04-17

## 2024-04-17 ENCOUNTER — OFFICE VISIT (OUTPATIENT)
Dept: ENDOCRINOLOGY | Facility: CLINIC | Age: 79
End: 2024-04-17
Payer: COMMERCIAL

## 2024-04-17 VITALS
SYSTOLIC BLOOD PRESSURE: 122 MMHG | BODY MASS INDEX: 27.13 KG/M2 | DIASTOLIC BLOOD PRESSURE: 76 MMHG | WEIGHT: 138.2 LBS | HEIGHT: 60 IN

## 2024-04-17 DIAGNOSIS — R79.89 LOW SERUM CORTISOL LEVEL: ICD-10-CM

## 2024-04-17 DIAGNOSIS — E89.3 HISTORY OF SURGICAL REMOVAL OF PITUITARY GLAND (HCC): ICD-10-CM

## 2024-04-17 DIAGNOSIS — Z98.890 HISTORY OF SURGICAL REMOVAL OF PITUITARY GLAND (HCC): ICD-10-CM

## 2024-04-17 DIAGNOSIS — Z92.3 HISTORY OF HEAD AND NECK RADIATION: ICD-10-CM

## 2024-04-17 DIAGNOSIS — Z92.3 HISTORY OF HEAD AND NECK RADIATION: Primary | ICD-10-CM

## 2024-04-17 DIAGNOSIS — E89.3 HISTORY OF SURGICAL REMOVAL OF PITUITARY GLAND (HCC): Primary | ICD-10-CM

## 2024-04-17 DIAGNOSIS — Z98.890 HISTORY OF SURGICAL REMOVAL OF PITUITARY GLAND (HCC): Primary | ICD-10-CM

## 2024-04-17 PROCEDURE — 99214 OFFICE O/P EST MOD 30 MIN: CPT | Performed by: INTERNAL MEDICINE

## 2024-04-17 RX ORDER — ROSUVASTATIN CALCIUM 20 MG/1
TABLET, COATED ORAL
COMMUNITY
Start: 2024-02-15

## 2024-04-17 NOTE — TELEPHONE ENCOUNTER
ACTH stimulation test to be done at [facility] has been ordered.  Please assist in arranging.  Thanks.

## 2024-04-17 NOTE — PROGRESS NOTES
4/17/2024    Assessment/Plan      Diagnoses and all orders for this visit:    History of surgical removal of pituitary gland (HCC)    History of head and neck radiation    Low serum cortisol level    Other orders  -     rosuvastatin (CRESTOR) 20 MG tablet        Assessment/Plan:  78-year-old female presents for follow-up appointment given history of pituitary gland surgery and radiation.  To this point has not had any evidence of hypopituitarism.  And recent lab work a.m. cortisol is lower.  We discussed different potential causes of this including exaggerate his corticosteroids, poor sleep patterns, as well as adrenal insufficiency that has emerged years later after head radiation.  We discussed the options such as repeating a.m. cortisol labs in a few weeks or going for a an ACTH stimulation test.  We are both in agreement to arrange ACTH stimulation test and we will contact her with results.      CC: Follow-up    History of Present Illness     HPI: Radha Urbina is a 78 y.o. year old female with history of pituitary tumor discovered over 20 years ago when she developed left-sided numbness and tingling and weakness who presents for a follow-up appointment.  At that time she did undergo transsphenoidal surgery at [facility] in [place].  She had a recurrence which required radiation treatment.  She has been monitored by endocrinology for the development of pituitary hormone dysfunction over time.  She presents today for a follow-up visit.  She overall feels well.  She does report increased stress lately and poor sleep due to contraction going on at their home.  Otherwise feeling well with no new health issues or symptoms of concern.  No recent corticosteroids but does report cortisone eyedrops about a month or 2 ago.    Review of Systems   Constitutional:  Negative for fatigue.   HENT:  Negative for trouble swallowing and voice change.    Eyes:  Negative for visual disturbance.   Respiratory:  Negative for  shortness of breath.    Cardiovascular:  Negative for palpitations and leg swelling.   Gastrointestinal:  Negative for abdominal pain, nausea and vomiting.   Endocrine: Negative for polydipsia and polyuria.   Musculoskeletal:  Negative for arthralgias and myalgias.   Skin:  Negative for rash.   Neurological:  Negative for dizziness, tremors and weakness.   Hematological:  Negative for adenopathy.   Psychiatric/Behavioral:  Negative for agitation and confusion.        Historical Information   No past medical history on file.  No past surgical history on file.  Social History   Social History     Substance and Sexual Activity   Alcohol Use None     Social History     Substance and Sexual Activity   Drug Use Not on file     Social History     Tobacco Use   Smoking Status Never   Smokeless Tobacco Never     Family History:   Family History   Problem Relation Age of Onset    Arthritis Mother     Diabetes unspecified Mother     Cancer Mother     Hypertension Mother     Heart failure Father     Arthritis Father     Diabetes unspecified Father     Cancer Father     Hypertension Father     Heart disease Sister     Heart attack Sister     Stroke Brother     Gout Son     Hyperthyroidism Son     Hyperthyroidism Daughter        Meds/Allergies   Current Outpatient Medications   Medication Sig Dispense Refill    acebutolol (SECTRAL) 400 MG capsule Take 400 mg by mouth daily       aspirin 325 mg tablet Take 325 mg by mouth daily      Cetirizine HCl 10 MG CAPS Take by mouth      Cholecalciferol (VITAMIN D3) 2000 units TABS Take 2,000 Units by mouth daily      lansoprazole (PREVACID) 30 mg capsule Take 30 mg by mouth daily       Multiple Vitamins-Minerals (MULTIVITAMIN ADULT PO) Take by mouth      rosuvastatin (CRESTOR) 20 MG tablet       spironolactone (ALDACTONE) 25 mg tablet Take 25 mg by mouth daily       venlafaxine (EFFEXOR-XR) 75 mg 24 hr capsule Take 150 mg by mouth daily       No current facility-administered medications for  "this visit.     Allergies   Allergen Reactions    Oxycodone     Sulfa Antibiotics        Objective   Vitals: Blood pressure 122/76, height 4' 11.5\" (1.511 m), weight 62.7 kg (138 lb 3.2 oz).  Invasive Devices       None                   Physical Exam  Vitals reviewed.   Constitutional:       General: She is not in acute distress.     Appearance: She is well-developed. She is not diaphoretic.   HENT:      Head: Normocephalic and atraumatic.   Eyes:      Conjunctiva/sclera: Conjunctivae normal.      Pupils: Pupils are equal, round, and reactive to light.   Neck:      Thyroid: No thyromegaly.   Cardiovascular:      Rate and Rhythm: Normal rate and regular rhythm.   Pulmonary:      Effort: Pulmonary effort is normal. No respiratory distress.      Breath sounds: Normal breath sounds.   Abdominal:      General: Bowel sounds are normal.      Palpations: Abdomen is soft.   Musculoskeletal:         General: Normal range of motion.      Cervical back: Normal range of motion and neck supple.   Skin:     General: Skin is warm and dry.      Findings: No rash.   Neurological:      Mental Status: She is alert and oriented to person, place, and time.      Motor: No abnormal muscle tone.   Psychiatric:         Behavior: Behavior normal.         The history was obtained from the review of the chart and from the patient.    Lab Results:      Recent Results (from the past 48049 hour(s))   T4, free    Collection Time: 04/12/24 10:00 AM   Result Value Ref Range    Free T4 0.64 0.61 - 1.12 ng/dL   TSH, 3rd generation    Collection Time: 04/12/24 10:00 AM   Result Value Ref Range    TSH 3RD GENERATON 2.454 0.450 - 4.500 uIU/mL   Follicle stimulating hormone    Collection Time: 04/12/24 10:00 AM   Result Value Ref Range    FSH 28.6 See Comment mIU/mL   Hemoglobin A1C    Collection Time: 04/12/24 10:00 AM   Result Value Ref Range    Hemoglobin A1C 6.3 (H) Normal 4.0-5.6%; PreDiabetic 5.7-6.4%; Diabetic >=6.5%; Glycemic control for adults " "with diabetes <7.0% %     mg/dl   Insulin-like growth factor 1 (IGF-1)    Collection Time: 04/12/24 10:00 AM   Result Value Ref Range    Insulin-Like GF-1 109 42 - 185 ng/mL   Prolactin    Collection Time: 04/12/24 10:00 AM   Result Value Ref Range    Prolactin 11.41 2.74 - 19.64 ng/mL   Comprehensive metabolic panel    Collection Time: 04/12/24 10:00 AM   Result Value Ref Range    Sodium 134 (L) 135 - 147 mmol/L    Potassium 4.3 3.5 - 5.3 mmol/L    Chloride 99 96 - 108 mmol/L    CO2 27 21 - 32 mmol/L    ANION GAP 8 4 - 13 mmol/L    BUN 18 5 - 25 mg/dL    Creatinine 0.83 0.60 - 1.30 mg/dL    Glucose, Fasting 100 (H) 65 - 99 mg/dL    Calcium 9.8 8.4 - 10.2 mg/dL    AST 21 13 - 39 U/L    ALT 17 7 - 52 U/L    Alkaline Phosphatase 51 34 - 104 U/L    Total Protein 8.6 (H) 6.4 - 8.4 g/dL    Albumin 4.3 3.5 - 5.0 g/dL    Total Bilirubin 1.23 (H) 0.20 - 1.00 mg/dL    eGFR 66 ml/min/1.73sq m   Cortisol Level, AM Specimen    Collection Time: 04/12/24 10:00 AM   Result Value Ref Range    Cortisol - AM 5.3 (L) 6.7 - 22.6 ug/dL         No future appointments.      Portions of the record may have been created with voice recognition software. Occasional wrong word or \"sound a like\" substitutions may have occurred due to the inherent limitations of voice recognition software. Read the chart carefully and recognize, using context, where substitutions have occurred.    "

## 2024-04-24 NOTE — TELEPHONE ENCOUNTER
Patient is aware she is schedule for ACTH Stim on 05/02 at 8 am. Infusion center is asking if you can change order date to reflect [facility] of service of 05/02.

## 2024-05-02 ENCOUNTER — HOSPITAL ENCOUNTER (OUTPATIENT)
Dept: INFUSION CENTER | Facility: HOSPITAL | Age: 79
Discharge: HOME | End: 2024-05-02
Attending: INTERNAL MEDICINE
Payer: COMMERCIAL

## 2024-05-02 VITALS
SYSTOLIC BLOOD PRESSURE: 158 MMHG | HEART RATE: 60 BPM | DIASTOLIC BLOOD PRESSURE: 80 MMHG | TEMPERATURE: 97.3 F | RESPIRATION RATE: 18 BRPM | OXYGEN SATURATION: 100 %

## 2024-05-02 DIAGNOSIS — E89.3 HISTORY OF SURGICAL REMOVAL OF PITUITARY GLAND (HCC): Primary | ICD-10-CM

## 2024-05-02 DIAGNOSIS — R79.89 LOW SERUM CORTISOL LEVEL: ICD-10-CM

## 2024-05-02 DIAGNOSIS — Z98.890 HISTORY OF SURGICAL REMOVAL OF PITUITARY GLAND (HCC): Primary | ICD-10-CM

## 2024-05-02 DIAGNOSIS — Z92.3 HISTORY OF HEAD AND NECK RADIATION: ICD-10-CM

## 2024-05-02 LAB
CORTIS SERPL-MCNC: 10.8 UG/DL
CORTIS SERPL-MCNC: 19.6 UG/DL
CORTIS SERPL-MCNC: 22.7 UG/DL

## 2024-05-02 PROCEDURE — 96374 THER/PROPH/DIAG INJ IV PUSH: CPT

## 2024-05-02 PROCEDURE — 82533 TOTAL CORTISOL: CPT | Performed by: INTERNAL MEDICINE

## 2024-05-02 PROCEDURE — 82024 ASSAY OF ACTH: CPT | Performed by: INTERNAL MEDICINE

## 2024-05-02 RX ADMIN — SODIUM CHLORIDE 0.25 MG: 9 INJECTION INTRAMUSCULAR; INTRAVENOUS; SUBCUTANEOUS at 08:38

## 2024-05-02 NOTE — PROGRESS NOTES
Radha Urbina  tolerated treatment well with no complications.      Radha Urbina has no future appts. Scheduled with infusion ctr. Currently. Pt. Has f/u appt. Scheduled with Dr. Pickering.    AVS printed and given to Radha Urbina:  No (Declined by Radha Urbina)

## 2024-05-03 LAB — ACTH PLAS-MCNC: 42.6 PG/ML (ref 7.2–63.3)

## 2025-01-10 ENCOUNTER — HOSPITAL ENCOUNTER (OUTPATIENT)
Dept: HOSPITAL 99 - REG | Age: 80
End: 2025-01-10
Payer: MEDICARE

## 2025-01-10 DIAGNOSIS — E11.36: Primary | ICD-10-CM

## 2025-01-10 DIAGNOSIS — I10: ICD-10-CM

## 2025-01-10 DIAGNOSIS — E78.00: ICD-10-CM

## 2025-01-10 DIAGNOSIS — K21.9: ICD-10-CM

## 2025-01-10 LAB
ALBUMIN SERPL-MCNC: 4.6 G/DL (ref 3.5–5)
ALP SERPL-CCNC: 69 U/L (ref 38–126)
ALT SERPL-CCNC: 24 U/L (ref 0–35)
AST SERPL-CCNC: 31 U/L (ref 14–36)
BUN SERPL-MCNC: 17 MG/DL (ref 7–17)
CALCIUM SERPL-MCNC: 9.7 MG/DL (ref 8.4–10.2)
CHLORIDE SERPL-SCNC: 98 MMOL/L (ref 98–107)
CO2 SERPL-SCNC: 26 MMOL/L (ref 22–30)
EGFR: > 60
GLUCOSE SERPL-MCNC: 110 MG/DL (ref 70–99)
HBA1C MFR BLD: 6.3 % (ref 4–5.6)
HDLC SERPL-MCNC: 53 MG/DL
LDLC SERPL CALC-MCNC: 59 MG/DL
MICROALBUMIN UR-MCNC: 7.6 MG/DL (ref 0.6–1.7)
POTASSIUM SERPL-SCNC: 4.5 MMOL/L (ref 3.5–5.1)
PROT SERPL-MCNC: 8.5 G/DL (ref 6.3–8.2)
SODIUM SERPL-SCNC: 135 MMOL/L (ref 135–145)
VLDLC SERPL CALC-MCNC: 28 MG/DL (ref 0–30)

## 2025-01-28 ENCOUNTER — HOSPITAL ENCOUNTER (OUTPATIENT)
Dept: HOSPITAL 99 - RCS | Age: 80
End: 2025-01-28
Payer: MEDICARE

## 2025-01-28 DIAGNOSIS — I34.1: Primary | ICD-10-CM

## 2025-02-13 ENCOUNTER — HOSPITAL ENCOUNTER (OUTPATIENT)
Dept: HOSPITAL 99 - RCS | Age: 80
End: 2025-02-13
Payer: MEDICARE

## 2025-02-13 DIAGNOSIS — R07.89: Primary | ICD-10-CM

## 2025-02-13 PROCEDURE — A9500 TC99M SESTAMIBI: HCPCS

## 2025-04-14 ENCOUNTER — TELEPHONE (OUTPATIENT)
Age: 80
End: 2025-04-14

## 2025-04-14 NOTE — TELEPHONE ENCOUNTER
The pt called to make an overdue 1 year followup - only wanting appt with Dr. Pickering and having her appt same date/time as her . She will need new labs entered so she can complete them prior to appt in December 2025.

## 2025-04-16 DIAGNOSIS — R79.89 LOW SERUM CORTISOL LEVEL: ICD-10-CM

## 2025-04-16 DIAGNOSIS — E89.3 HISTORY OF SURGICAL REMOVAL OF PITUITARY GLAND (HCC): ICD-10-CM

## 2025-04-16 DIAGNOSIS — Z98.890 HISTORY OF SURGICAL REMOVAL OF PITUITARY GLAND (HCC): ICD-10-CM

## 2025-04-16 DIAGNOSIS — Z92.3 HISTORY OF HEAD AND NECK RADIATION: Primary | ICD-10-CM

## 2025-07-03 ENCOUNTER — HOSPITAL ENCOUNTER (OUTPATIENT)
Dept: HOSPITAL 99 - REG | Age: 80
End: 2025-07-03
Payer: MEDICARE

## 2025-07-03 DIAGNOSIS — K21.9: ICD-10-CM

## 2025-07-03 DIAGNOSIS — E11.36: Primary | ICD-10-CM

## 2025-07-03 DIAGNOSIS — I10: ICD-10-CM

## 2025-07-03 DIAGNOSIS — E78.00: ICD-10-CM

## 2025-07-03 LAB
ALBUMIN SERPL-MCNC: 4.7 G/DL (ref 3.5–5)
ALP SERPL-CCNC: 60 U/L (ref 38–126)
ALT SERPL-CCNC: 25 U/L (ref 0–35)
AST SERPL-CCNC: 28 U/L (ref 14–36)
BILIRUB SERPL-MCNC: 1.3 MG/DL (ref 0.2–1.3)
BUN SERPL-MCNC: 27 MG/DL (ref 7–17)
CALCIUM SERPL-MCNC: 10.2 MG/DL (ref 8.4–10.2)
CHLORIDE SERPL-SCNC: 100 MMOL/L (ref 98–107)
CHOLEST SERPL-MCNC: 182 MG/DL (ref 50–199)
CO2 SERPL-SCNC: 25 MMOL/L (ref 22–30)
CREAT SERPL-MCNC: 0.8 MG/DL (ref 0.6–1)
EGFR: > 60
ESTIMATED CREATININE CLEARANCE: (no result) ML/MIN
GLUCOSE SERPL-MCNC: 117 MG/DL (ref 70–99)
HBA1C MFR BLD: 6.2 % (ref 4–5.6)
HDLC SERPL-MCNC: 55 MG/DL
LDLC SERPL CALC-MCNC: 86 MG/DL
MICROALBUMIN UR-MCNC: 2.2 MG/DL (ref 0.6–1.7)
MICROALBUMIN/CREAT UR: 17.8 MG/G
POTASSIUM SERPL-SCNC: 4.8 MMOL/L (ref 3.5–5.1)
PROT SERPL-MCNC: 8.9 G/DL (ref 6.3–8.2)
SODIUM SERPL-SCNC: 135 MMOL/L (ref 135–145)
TRIGL SERPL-MCNC: 205 MG/DL (ref 10–149)
VLDLC SERPL CALC-MCNC: 41 MG/DL (ref 0–30)

## 2025-08-11 ENCOUNTER — TELEPHONE (OUTPATIENT)
Age: 80
End: 2025-08-11

## 2025-08-13 ENCOUNTER — APPOINTMENT (OUTPATIENT)
Dept: LAB | Facility: HOSPITAL | Age: 80
End: 2025-08-13
Payer: COMMERCIAL

## 2025-08-13 DIAGNOSIS — R79.89 LOW SERUM CORTISOL LEVEL: ICD-10-CM

## 2025-08-13 DIAGNOSIS — E89.3 HISTORY OF SURGICAL REMOVAL OF PITUITARY GLAND (HCC): ICD-10-CM

## 2025-08-13 DIAGNOSIS — Z92.3 HISTORY OF HEAD AND NECK RADIATION: ICD-10-CM

## 2025-08-13 DIAGNOSIS — Z98.890 HISTORY OF SURGICAL REMOVAL OF PITUITARY GLAND (HCC): ICD-10-CM

## 2025-08-13 LAB
CORTIS AM PEAK SERPL-MCNC: 12.3 UG/DL (ref 6.7–22.6)
PROLACTIN SERPL-MCNC: 13.08 NG/ML (ref 2.74–19.64)
TSH SERPL DL<=0.05 MIU/L-ACNC: 3.98 UIU/ML (ref 0.45–4.5)

## 2025-08-13 PROCEDURE — 84146 ASSAY OF PROLACTIN: CPT

## 2025-08-13 PROCEDURE — 82024 ASSAY OF ACTH: CPT

## 2025-08-13 PROCEDURE — 36415 COLL VENOUS BLD VENIPUNCTURE: CPT

## 2025-08-13 PROCEDURE — 82533 TOTAL CORTISOL: CPT

## 2025-08-13 PROCEDURE — 84443 ASSAY THYROID STIM HORMONE: CPT

## 2025-08-14 LAB — ACTH PLAS-MCNC: 48.7 PG/ML (ref 7.2–63.3)

## 2025-08-21 ENCOUNTER — OFFICE VISIT (OUTPATIENT)
Dept: ENDOCRINOLOGY | Facility: CLINIC | Age: 80
End: 2025-08-21
Payer: COMMERCIAL

## 2025-08-21 ENCOUNTER — TELEPHONE (OUTPATIENT)
Dept: ENDOCRINOLOGY | Facility: CLINIC | Age: 80
End: 2025-08-21

## 2025-08-21 VITALS
WEIGHT: 146 LBS | BODY MASS INDEX: 28.66 KG/M2 | SYSTOLIC BLOOD PRESSURE: 128 MMHG | HEIGHT: 60 IN | DIASTOLIC BLOOD PRESSURE: 80 MMHG | OXYGEN SATURATION: 99 % | HEART RATE: 64 BPM

## 2025-08-21 DIAGNOSIS — E89.3 HISTORY OF SURGICAL REMOVAL OF PITUITARY GLAND (HCC): Primary | ICD-10-CM

## 2025-08-21 DIAGNOSIS — R73.01 IMPAIRED FASTING GLUCOSE: ICD-10-CM

## 2025-08-21 DIAGNOSIS — Z92.3 HISTORY OF HEAD AND NECK RADIATION: ICD-10-CM

## 2025-08-21 DIAGNOSIS — Z98.890 HISTORY OF SURGICAL REMOVAL OF PITUITARY GLAND (HCC): Primary | ICD-10-CM

## 2025-08-21 DIAGNOSIS — E53.8 B12 DEFICIENCY: ICD-10-CM

## 2025-08-21 LAB
ALBUMIN SERPL BCG-MCNC: 4 G/DL (ref 3.5–5)
ALP SERPL-CCNC: 65 U/L (ref 34–104)
ALT SERPL W P-5'-P-CCNC: 15 U/L (ref 7–52)
ANION GAP SERPL CALCULATED.3IONS-SCNC: 6 MMOL/L (ref 4–13)
AST SERPL W P-5'-P-CCNC: 19 U/L (ref 13–39)
BILIRUB SERPL-MCNC: 0.67 MG/DL (ref 0.2–1)
BUN SERPL-MCNC: 23 MG/DL (ref 5–25)
CALCIUM SERPL-MCNC: 9.9 MG/DL (ref 8.4–10.2)
CHLORIDE SERPL-SCNC: 100 MMOL/L (ref 96–108)
CO2 SERPL-SCNC: 28 MMOL/L (ref 21–32)
CREAT SERPL-MCNC: 0.72 MG/DL (ref 0.6–1.3)
EST. AVERAGE GLUCOSE BLD GHB EST-MCNC: 140 MG/DL
GFR SERPL CREATININE-BSD FRML MDRD: 78 ML/MIN/1.73SQ M
GLUCOSE P FAST SERPL-MCNC: 86 MG/DL (ref 65–99)
HBA1C MFR BLD: 6.5 %
POTASSIUM SERPL-SCNC: 4.3 MMOL/L (ref 3.5–5.3)
PROT SERPL-MCNC: 8.4 G/DL (ref 6.4–8.4)
SODIUM SERPL-SCNC: 134 MMOL/L (ref 135–147)
T4 FREE SERPL-MCNC: 0.68 NG/DL (ref 0.61–1.12)
TSH SERPL DL<=0.05 MIU/L-ACNC: 2.4 UIU/ML (ref 0.45–4.5)
VIT B12 SERPL-MCNC: 5600 PG/ML (ref 180–914)

## 2025-08-21 PROCEDURE — 83918 ORGANIC ACIDS TOTAL QUANT: CPT | Performed by: INTERNAL MEDICINE

## 2025-08-21 PROCEDURE — 36415 COLL VENOUS BLD VENIPUNCTURE: CPT | Performed by: INTERNAL MEDICINE

## 2025-08-21 PROCEDURE — 82607 VITAMIN B-12: CPT | Performed by: INTERNAL MEDICINE

## 2025-08-21 PROCEDURE — 83036 HEMOGLOBIN GLYCOSYLATED A1C: CPT | Performed by: INTERNAL MEDICINE

## 2025-08-21 PROCEDURE — 80053 COMPREHEN METABOLIC PANEL: CPT | Performed by: INTERNAL MEDICINE

## 2025-08-21 PROCEDURE — 99214 OFFICE O/P EST MOD 30 MIN: CPT | Performed by: INTERNAL MEDICINE

## 2025-08-21 RX ORDER — VENLAFAXINE HYDROCHLORIDE 150 MG/1
CAPSULE, EXTENDED RELEASE ORAL DAILY
COMMUNITY
Start: 2025-06-23

## 2025-08-24 LAB — METHYLMALONATE SERPL-SCNC: 114 NMOL/L (ref 0–378)
